# Patient Record
Sex: MALE | Race: WHITE
[De-identification: names, ages, dates, MRNs, and addresses within clinical notes are randomized per-mention and may not be internally consistent; named-entity substitution may affect disease eponyms.]

---

## 2017-10-19 ENCOUNTER — HOSPITAL ENCOUNTER (EMERGENCY)
Dept: HOSPITAL 54 - ER | Age: 67
Discharge: HOME | End: 2017-10-19
Payer: COMMERCIAL

## 2017-10-19 VITALS — HEIGHT: 71 IN | WEIGHT: 200 LBS | BODY MASS INDEX: 28 KG/M2

## 2017-10-19 VITALS — DIASTOLIC BLOOD PRESSURE: 84 MMHG | SYSTOLIC BLOOD PRESSURE: 139 MMHG

## 2017-10-19 DIAGNOSIS — E78.00: ICD-10-CM
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
## 2017-10-19 DIAGNOSIS — S39.012A: Primary | ICD-10-CM
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
## 2017-10-19 DIAGNOSIS — Z95.2: ICD-10-CM
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
## 2017-10-19 DIAGNOSIS — S49.81XA: ICD-10-CM

## 2017-10-19 DIAGNOSIS — Z88.0: ICD-10-CM

## 2017-10-19 DIAGNOSIS — I10: ICD-10-CM
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
## 2017-10-19 DIAGNOSIS — E11.9: ICD-10-CM

## 2017-10-19 PROCEDURE — 72100 X-RAY EXAM L-S SPINE 2/3 VWS: CPT

## 2017-10-19 PROCEDURE — Z7610: HCPCS

## 2017-10-19 PROCEDURE — 99284 EMERGENCY DEPT VISIT MOD MDM: CPT

## 2017-10-19 PROCEDURE — A4606 OXYGEN PROBE USED W OXIMETER: HCPCS

## 2017-10-19 PROCEDURE — 73030 X-RAY EXAM OF SHOULDER: CPT

## 2018-06-08 ENCOUNTER — HOSPITAL ENCOUNTER (EMERGENCY)
Dept: HOSPITAL 54 - ER | Age: 68
Discharge: HOME | End: 2018-06-08
Payer: MEDICARE

## 2018-06-08 VITALS — SYSTOLIC BLOOD PRESSURE: 115 MMHG | DIASTOLIC BLOOD PRESSURE: 74 MMHG

## 2018-06-08 VITALS — WEIGHT: 190 LBS | HEIGHT: 67 IN | BODY MASS INDEX: 29.82 KG/M2

## 2018-06-08 DIAGNOSIS — Z95.2: ICD-10-CM

## 2018-06-08 DIAGNOSIS — I48.91: ICD-10-CM

## 2018-06-08 DIAGNOSIS — Z88.0: ICD-10-CM

## 2018-06-08 DIAGNOSIS — E78.00: ICD-10-CM

## 2018-06-08 DIAGNOSIS — Z79.4: ICD-10-CM

## 2018-06-08 DIAGNOSIS — I10: ICD-10-CM

## 2018-06-08 DIAGNOSIS — M25.511: Primary | ICD-10-CM

## 2018-06-08 DIAGNOSIS — E11.9: ICD-10-CM

## 2018-06-08 PROCEDURE — A4606 OXYGEN PROBE USED W OXIMETER: HCPCS

## 2018-06-08 PROCEDURE — Z7610: HCPCS

## 2020-08-26 ENCOUNTER — OFFICE (OUTPATIENT)
Dept: URBAN - METROPOLITAN AREA CLINIC 57 | Facility: CLINIC | Age: 70
End: 2020-08-26

## 2020-08-26 VITALS
HEIGHT: 67 IN | SYSTOLIC BLOOD PRESSURE: 136 MMHG | WEIGHT: 195 LBS | HEART RATE: 61 BPM | DIASTOLIC BLOOD PRESSURE: 88 MMHG

## 2020-08-26 DIAGNOSIS — I10 HYPERTENSION: ICD-10-CM

## 2020-08-26 DIAGNOSIS — Z95.2 PRESENCE OF PROSTHETIC HEART VALVE: ICD-10-CM

## 2020-08-26 DIAGNOSIS — E78.5 HYPERLIPIDEMIA, UNSPECIFIED: ICD-10-CM

## 2020-08-26 DIAGNOSIS — R10.11 RUQ PAIN: ICD-10-CM

## 2020-08-26 DIAGNOSIS — E66.9 OBESITY: ICD-10-CM

## 2020-08-26 DIAGNOSIS — R94.5 ABNORMAL LFTS: ICD-10-CM

## 2020-08-26 DIAGNOSIS — E10.9 TYPE 1 DIABETES MELLITUS: ICD-10-CM

## 2020-08-26 PROCEDURE — 82272 OCCULT BLD FECES 1-3 TESTS: CPT | Performed by: INTERNAL MEDICINE

## 2020-08-26 PROCEDURE — 99204 OFFICE O/P NEW MOD 45 MIN: CPT | Performed by: INTERNAL MEDICINE
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
## 2021-04-13 ENCOUNTER — HOSPITAL ENCOUNTER (EMERGENCY)
Dept: HOSPITAL 12 - ER | Age: 71
Discharge: HOME | End: 2021-04-13
Payer: MEDICARE

## 2021-04-13 VITALS — BODY MASS INDEX: 29.82 KG/M2 | HEIGHT: 67 IN | WEIGHT: 190 LBS

## 2021-04-13 VITALS — SYSTOLIC BLOOD PRESSURE: 129 MMHG | DIASTOLIC BLOOD PRESSURE: 61 MMHG

## 2021-04-13 DIAGNOSIS — R07.89: ICD-10-CM

## 2021-04-13 DIAGNOSIS — E11.65: Primary | ICD-10-CM

## 2021-04-13 DIAGNOSIS — Z95.2: ICD-10-CM

## 2021-04-13 LAB
ALP SERPL-CCNC: 66 U/L (ref 50–136)
ALT SERPL W/O P-5'-P-CCNC: 49 U/L (ref 16–63)
APPEARANCE UR: CLEAR
AST SERPL-CCNC: 34 U/L (ref 15–37)
BASOPHILS # BLD AUTO: 0.1 K/UL (ref 0–8)
BASOPHILS NFR BLD AUTO: 0.9 % (ref 0–2)
BILIRUB DIRECT SERPL-MCNC: 0.1 MG/DL (ref 0–0.2)
BILIRUB SERPL-MCNC: 0.4 MG/DL (ref 0.2–1)
BILIRUB UR QL STRIP: NEGATIVE
BUN SERPL-MCNC: 26 MG/DL (ref 7–18)
CA PHOS CRY URNS QL MICRO: (no result) /HPF
CHLORIDE SERPL-SCNC: 103 MMOL/L (ref 98–107)
CO2 SERPL-SCNC: 27 MMOL/L (ref 21–32)
COLOR UR: YELLOW
CREAT SERPL-MCNC: 1.3 MG/DL (ref 0.6–1.3)
DEPRECATED SQUAMOUS URNS QL MICRO: (no result) /HPF
EOSINOPHIL # BLD AUTO: 0.3 K/UL (ref 0–0.7)
EOSINOPHIL NFR BLD AUTO: 3.6 % (ref 0–7)
GLUCOSE SERPL-MCNC: 226 MG/DL (ref 74–106)
GLUCOSE UR STRIP-MCNC: NEGATIVE MG/DL
HCT VFR BLD AUTO: 45.9 % (ref 36.7–47.1)
HGB BLD-MCNC: 15.5 G/DL (ref 12.5–16.3)
HGB UR QL STRIP: (no result)
KETONES UR STRIP-MCNC: NEGATIVE MG/DL
LEUKOCYTE ESTERASE UR QL STRIP: NEGATIVE
LIPASE SERPL-CCNC: 303 U/L (ref 73–393)
LYMPHOCYTES # BLD AUTO: 2.2 K/UL (ref 20–40)
LYMPHOCYTES NFR BLD AUTO: 24.7 % (ref 20.5–51.5)
MCH RBC QN AUTO: 29.9 UUG (ref 23.8–33.4)
MCHC RBC AUTO-ENTMCNC: 34 G/DL (ref 32.5–36.3)
MCV RBC AUTO: 88.4 FL (ref 73–96.2)
MONOCYTES # BLD AUTO: 0.7 K/UL (ref 2–10)
MONOCYTES NFR BLD AUTO: 8 % (ref 0–11)
MUCOUS THREADS URNS QL MICRO: (no result) /LPF
NEUTROPHILS # BLD AUTO: 5.5 K/UL (ref 1.8–8.9)
NEUTROPHILS NFR BLD AUTO: 62.8 % (ref 38.5–71.5)
NITRITE UR QL STRIP: NEGATIVE
PH UR STRIP: 5.5 [PH] (ref 5–8)
PLATELET # BLD AUTO: 164 K/UL (ref 152–348)
POTASSIUM SERPL-SCNC: 4.8 MMOL/L (ref 3.5–5.1)
RBC # BLD AUTO: 5.2 MIL/UL (ref 4.06–5.63)
RBC #/AREA URNS HPF: (no result) /HPF (ref 0–3)
SP GR UR STRIP: 1.02 (ref 1–1.03)
UROBILINOGEN UR STRIP-MCNC: 0.2 E.U./DL
WBC # BLD AUTO: 8.8 K/UL (ref 3.6–10.2)
WBC #/AREA URNS HPF: (no result) /HPF
WBC #/AREA URNS HPF: (no result) /HPF (ref 0–3)
WS STN SPEC: 7.1 G/DL (ref 6.4–8.2)

## 2021-04-13 PROCEDURE — A4663 DIALYSIS BLOOD PRESSURE CUFF: HCPCS

## 2021-04-13 NOTE — NUR
Patient discharged to home in stable condition.  Written and verbal after care 
instructions given. 

Patient verbalizes understanding of instructions. Stressed follow up or return 
to ER for worsening s/s.pt walks in steady gait .

## 2022-02-21 ENCOUNTER — HOSPITAL ENCOUNTER (INPATIENT)
Dept: HOSPITAL 54 - ER | Age: 72
LOS: 6 days | Discharge: HOME | DRG: 438 | End: 2022-02-27
Attending: INTERNAL MEDICINE | Admitting: NURSE PRACTITIONER
Payer: MEDICARE

## 2022-02-21 VITALS — DIASTOLIC BLOOD PRESSURE: 98 MMHG | SYSTOLIC BLOOD PRESSURE: 129 MMHG

## 2022-02-21 VITALS — DIASTOLIC BLOOD PRESSURE: 67 MMHG | SYSTOLIC BLOOD PRESSURE: 97 MMHG

## 2022-02-21 VITALS — SYSTOLIC BLOOD PRESSURE: 88 MMHG | DIASTOLIC BLOOD PRESSURE: 65 MMHG

## 2022-02-21 VITALS — DIASTOLIC BLOOD PRESSURE: 88 MMHG | SYSTOLIC BLOOD PRESSURE: 123 MMHG

## 2022-02-21 VITALS — DIASTOLIC BLOOD PRESSURE: 57 MMHG | SYSTOLIC BLOOD PRESSURE: 119 MMHG

## 2022-02-21 VITALS — SYSTOLIC BLOOD PRESSURE: 91 MMHG | DIASTOLIC BLOOD PRESSURE: 47 MMHG

## 2022-02-21 VITALS — DIASTOLIC BLOOD PRESSURE: 71 MMHG | SYSTOLIC BLOOD PRESSURE: 111 MMHG

## 2022-02-21 VITALS — HEIGHT: 67 IN | WEIGHT: 190 LBS | BODY MASS INDEX: 29.82 KG/M2

## 2022-02-21 VITALS — DIASTOLIC BLOOD PRESSURE: 67 MMHG | SYSTOLIC BLOOD PRESSURE: 102 MMHG

## 2022-02-21 DIAGNOSIS — E87.2: ICD-10-CM

## 2022-02-21 DIAGNOSIS — E11.65: ICD-10-CM

## 2022-02-21 DIAGNOSIS — K85.90: Primary | ICD-10-CM

## 2022-02-21 DIAGNOSIS — E87.5: ICD-10-CM

## 2022-02-21 DIAGNOSIS — I71.9: ICD-10-CM

## 2022-02-21 DIAGNOSIS — I10: ICD-10-CM

## 2022-02-21 DIAGNOSIS — D68.9: ICD-10-CM

## 2022-02-21 DIAGNOSIS — K80.80: ICD-10-CM

## 2022-02-21 DIAGNOSIS — Z79.4: ICD-10-CM

## 2022-02-21 DIAGNOSIS — Z20.822: ICD-10-CM

## 2022-02-21 DIAGNOSIS — D72.829: ICD-10-CM

## 2022-02-21 DIAGNOSIS — N17.0: ICD-10-CM

## 2022-02-21 DIAGNOSIS — K76.0: ICD-10-CM

## 2022-02-21 DIAGNOSIS — Z87.891: ICD-10-CM

## 2022-02-21 DIAGNOSIS — K21.9: ICD-10-CM

## 2022-02-21 DIAGNOSIS — E78.5: ICD-10-CM

## 2022-02-21 DIAGNOSIS — J96.01: ICD-10-CM

## 2022-02-21 DIAGNOSIS — Z95.2: ICD-10-CM

## 2022-02-21 DIAGNOSIS — E87.1: ICD-10-CM

## 2022-02-21 DIAGNOSIS — Z79.84: ICD-10-CM

## 2022-02-21 DIAGNOSIS — K52.9: ICD-10-CM

## 2022-02-21 DIAGNOSIS — E78.00: ICD-10-CM

## 2022-02-21 LAB
ALBUMIN SERPL BCP-MCNC: 3.8 G/DL (ref 3.4–5)
ALP SERPL-CCNC: 73 U/L (ref 46–116)
ALT SERPL W P-5'-P-CCNC: 88 U/L (ref 12–78)
AST SERPL W P-5'-P-CCNC: 89 U/L (ref 15–37)
BASOPHILS # BLD AUTO: 0 K/UL (ref 0–0.2)
BASOPHILS NFR BLD AUTO: 0.1 % (ref 0–2)
BILIRUB DIRECT SERPL-MCNC: 0.5 MG/DL (ref 0–0.2)
BILIRUB SERPL-MCNC: 1 MG/DL (ref 0.2–1)
BUN SERPL-MCNC: 25 MG/DL (ref 7–18)
BUN SERPL-MCNC: 36 MG/DL (ref 7–18)
BUN SERPL-MCNC: 40 MG/DL (ref 7–18)
CALCIUM SERPL-MCNC: 7.5 MG/DL (ref 8.5–10.1)
CALCIUM SERPL-MCNC: 7.7 MG/DL (ref 8.5–10.1)
CALCIUM SERPL-MCNC: 8.9 MG/DL (ref 8.5–10.1)
CHLORIDE SERPL-SCNC: 101 MMOL/L (ref 98–107)
CHLORIDE SERPL-SCNC: 98 MMOL/L (ref 98–107)
CHLORIDE SERPL-SCNC: 99 MMOL/L (ref 98–107)
CO2 SERPL-SCNC: 13 MMOL/L (ref 21–32)
CO2 SERPL-SCNC: 18 MMOL/L (ref 21–32)
CO2 SERPL-SCNC: 23 MMOL/L (ref 21–32)
CREAT SERPL-MCNC: 1.5 MG/DL (ref 0.6–1.3)
CREAT SERPL-MCNC: 3.1 MG/DL (ref 0.6–1.3)
CREAT SERPL-MCNC: 3.6 MG/DL (ref 0.6–1.3)
EOSINOPHIL NFR BLD AUTO: 0.1 % (ref 0–6)
GLUCOSE SERPL-MCNC: 354 MG/DL (ref 74–106)
GLUCOSE SERPL-MCNC: 694 MG/DL (ref 74–106)
HCT VFR BLD AUTO: 50 % (ref 39–51)
HGB BLD-MCNC: 16.4 G/DL (ref 13.5–17.5)
LYMPHOCYTES NFR BLD AUTO: 0.6 K/UL (ref 0.8–4.8)
LYMPHOCYTES NFR BLD AUTO: 2.9 % (ref 20–44)
MCHC RBC AUTO-ENTMCNC: 33 G/DL (ref 31–36)
MCV RBC AUTO: 85 FL (ref 80–96)
MONOCYTES NFR BLD AUTO: 1.2 K/UL (ref 0.1–1.3)
MONOCYTES NFR BLD AUTO: 5.8 % (ref 2–12)
NEUTROPHILS # BLD AUTO: 18.6 K/UL (ref 1.8–8.9)
NEUTROPHILS NFR BLD AUTO: 91.1 % (ref 43–81)
PLATELET # BLD AUTO: 213 K/UL (ref 150–450)
POTASSIUM SERPL-SCNC: 3.9 MMOL/L (ref 3.5–5.1)
POTASSIUM SERPL-SCNC: 5.2 MMOL/L (ref 3.5–5.1)
POTASSIUM SERPL-SCNC: 6.7 MMOL/L (ref 3.5–5.1)
PROT SERPL-MCNC: 7.2 G/DL (ref 6.4–8.2)
RBC # BLD AUTO: 5.94 MIL/UL (ref 4.5–6)
SODIUM SERPL-SCNC: 130 MMOL/L (ref 136–145)
SODIUM SERPL-SCNC: 134 MMOL/L (ref 136–145)
SODIUM SERPL-SCNC: 136 MMOL/L (ref 136–145)
WBC NRBC COR # BLD AUTO: 20.5 K/UL (ref 4.3–11)

## 2022-02-21 PROCEDURE — C9803 HOPD COVID-19 SPEC COLLECT: HCPCS

## 2022-02-21 PROCEDURE — G0378 HOSPITAL OBSERVATION PER HR: HCPCS

## 2022-02-21 PROCEDURE — A9537 TC99M MEBROFENIN: HCPCS

## 2022-02-21 PROCEDURE — A9563 P32 NA PHOSPHATE: HCPCS

## 2022-02-21 PROCEDURE — C9113 INJ PANTOPRAZOLE SODIUM, VIA: HCPCS

## 2022-02-21 PROCEDURE — A4216 STERILE WATER/SALINE, 10 ML: HCPCS

## 2022-02-21 RX ADMIN — Medication SCH EACH: at 11:58

## 2022-02-21 RX ADMIN — SODIUM CHLORIDE, SODIUM LACTATE, POTASSIUM CHLORIDE, AND CALCIUM CHLORIDE PRN MLS/HR: .6; .31; .03; .02 INJECTION, SOLUTION INTRAVENOUS at 16:52

## 2022-02-21 RX ADMIN — Medication SCH EACH: at 20:05

## 2022-02-21 RX ADMIN — DEXTROSE MONOHYDRATE PRN MG: 50 INJECTION, SOLUTION INTRAVENOUS at 08:32

## 2022-02-21 RX ADMIN — CIPROFLOXACIN SCH MLS/HR: 2 INJECTION, SOLUTION INTRAVENOUS at 06:18

## 2022-02-21 RX ADMIN — CIPROFLOXACIN SCH MLS/HR: 2 INJECTION, SOLUTION INTRAVENOUS at 17:21

## 2022-02-21 RX ADMIN — HYDROMORPHONE HYDROCHLORIDE PRN MG: 1 INJECTION, SOLUTION INTRAMUSCULAR; INTRAVENOUS; SUBCUTANEOUS at 15:53

## 2022-02-21 RX ADMIN — Medication SCH MLS/HR: at 13:00

## 2022-02-21 RX ADMIN — SODIUM CHLORIDE, SODIUM LACTATE, POTASSIUM CHLORIDE, AND CALCIUM CHLORIDE PRN MLS/HR: .6; .31; .03; .02 INJECTION, SOLUTION INTRAVENOUS at 09:44

## 2022-02-21 RX ADMIN — Medication SCH EACH: at 17:35

## 2022-02-21 RX ADMIN — Medication SCH MLS/HR: at 20:51

## 2022-02-21 RX ADMIN — DEXTROSE MONOHYDRATE PRN MG: 50 INJECTION, SOLUTION INTRAVENOUS at 22:14

## 2022-02-21 RX ADMIN — SODIUM CHLORIDE SCH MG: 9 INJECTION, SOLUTION INTRAVENOUS at 08:56

## 2022-02-21 RX ADMIN — Medication SCH MCG: at 08:56

## 2022-02-21 RX ADMIN — LISINOPRIL SCH MG: 10 TABLET ORAL at 08:55

## 2022-02-21 RX ADMIN — Medication SCH EACH: at 19:00

## 2022-02-21 RX ADMIN — Medication SCH EACH: at 07:54

## 2022-02-21 RX ADMIN — ATORVASTATIN CALCIUM SCH MG: 40 TABLET, FILM COATED ORAL at 21:49

## 2022-02-21 RX ADMIN — HYDROMORPHONE HYDROCHLORIDE PRN MG: 1 INJECTION, SOLUTION INTRAMUSCULAR; INTRAVENOUS; SUBCUTANEOUS at 08:33

## 2022-02-21 RX ADMIN — Medication SCH EACH: at 23:00

## 2022-02-21 RX ADMIN — Medication SCH EACH: at 21:05

## 2022-02-21 RX ADMIN — SODIUM CHLORIDE PRN MLS/HR: 9 INJECTION, SOLUTION INTRAVENOUS at 18:56

## 2022-02-21 RX ADMIN — DEXTROSE MONOHYDRATE PRN MG: 50 INJECTION, SOLUTION INTRAVENOUS at 15:52

## 2022-02-21 RX ADMIN — Medication SCH EACH: at 12:00

## 2022-02-21 RX ADMIN — Medication SCH EACH: at 22:00

## 2022-02-21 RX ADMIN — SODIUM CHLORIDE PRN MLS/HR: 9 INJECTION, SOLUTION INTRAVENOUS at 22:03

## 2022-02-21 RX ADMIN — SODIUM CHLORIDE, SODIUM LACTATE, POTASSIUM CHLORIDE, AND CALCIUM CHLORIDE PRN MLS/HR: .6; .31; .03; .02 INJECTION, SOLUTION INTRAVENOUS at 22:26

## 2022-02-21 NOTE — NUR
BIB WIFE FOR C/O ABD 7/10 PAIN , N/V X 2 HOURS DENIED HEMATURIA OR DYSURIA, - 
DIARRHEA. PT TOOK TYLENOL PTA AT 2000 WITH NO RELIEF. PT A/OX4. TOLERATING R/A 
AT 92% ON R/A. CONNECTED PT TO POX AND MONITOR.

## 2022-02-21 NOTE — NUR
RN ICU NOTE



RECEIVED CALL FROM LAB POTASSIUM 6.7 AND GLUCOSE 694 NOTIFIED ON CALL RAMIN MOORE SHE SAID WE 
WILL RECHECK AT 22:00 NOTED AND CARRIED OUT.

## 2022-02-21 NOTE — NUR
RN NOTE



CHECKED BLOOD SUGAR AT 2100 SHOWS HI AGAIN PER PROTOCOL STAT LAB  GLUCOSE CHECK NOTED AND 
CARRIED OUT

## 2022-02-21 NOTE — NUR
RN ICU

CARE ENDORSED TO AMADO.  INSULIN DRIP STARTED AT 10 UNITS/HR AS ORDERED. AWAITING CHEM 7 
RESULT.  ACCUCHECK>600.  REGULAR AND GLARGINE INSULINS FROM AGGRESSIVE SCALE STOPPED AS 
ORDERED.

## 2022-02-21 NOTE — NUR
RN NOTE- ACCU CHECK BS- 537 . AGGRESSIVE SSI ORDERED 20U. NP RENÉ NOTIFIED AND APPROVED. 
RECHECK IN 30 MINUTES

## 2022-02-21 NOTE — NUR
RN NOTE- 72 Y/O MALE BIB EMERGENCY ROOM STAFF TO UNIT. ADMITTED R/O PANCREATITIS. ABD PAIN 
SEVERAL DAYS. PT ALLERGIC TO PCN, SKIN INTACT . AOX4, CALM INTERACTIVE . PAIN TO EPIGASTRIC 
AREA DECREASED W DILAUDID IVP. VS- BP- 129/98, HR- 102, RR- 18, T- 98.8, 02 SATS 98% ON 2LPM 
VIA NC. PIV 18G TO RAC, INFUSING NS AT 90/HR. ORDERS RECEIVED/ COMPLIED WITH. BED LOCKED., 
SIDE RAILS UP CALL LIGHT IN REACH. MONITOR / ASSIST

## 2022-02-21 NOTE — NUR
RN ICU

RECEIVED PT FROM ER VIA BED.  ACCUCHECK DONE.  CHEM 7 ORDERED, INSULIN DRIP TO START AFTER 
ORDER RECEIVED FROM RONALDO MERRITT DNP.  PT NPO FOR NOW.

## 2022-02-21 NOTE — NUR
RN NOTE- PT BROUGHT TO ROOM, AMBULATORY TO BR, AOX4, CALM INTERACTIVE DENIES PAIN. MADE 
COMFORTABLE. BEGIN ADMISSION PROCESS

## 2022-02-21 NOTE — NUR
RN NOTE- NP RENÉ ORDERED TRANSFER OF PT TO ICU FOR BLOOD GLUCOSE CONTROL. TRANSFERRED PT 
TO ICU CARE, REPORT GIVEN IN PERSON TO ICU CHARGE PURVI SCHULTE. ASSISTED W MAKING PT COMFORTABLE 
AND ALL ORDERS FOLLOWED THROUGH.

## 2022-02-21 NOTE — NUR
RN NOTE- PT ACCUCHECK  THIS AM. NP RENÉ ON UNIT. GAVE 10 U SSI PER ORDERS. 
RECHECKED 90 MINUTES LATER ACCU CHECK BS- 534. AM DOSE GLIPIZIDE 10 MG ADMINISTERED . 
CHANGING SSI TO AGGRESSIVE FROM MODERATE

## 2022-02-21 NOTE — NUR
RN NOTE



RECEIVED PATIENT IN BED RESTING ALERT ORIENTED X3 ON 4L OXYGEN VIA NASAL CANNULA O2:90% IV 
SITE IS ON RIGHT AC INTACT PATENT ON LACTATED RINGER 250CC/HR,ON INSULIN DRIP 14 UNIT/HR 
,SAFETY MEASURE IMPLEMENT BED IN LOW POSITION AND LOCKED,CALL LIGHT WITHIN REACH,HEAD OF THE 
BED ELEVATED CONTINUE TO MONITOR.

## 2022-02-22 VITALS — DIASTOLIC BLOOD PRESSURE: 77 MMHG | SYSTOLIC BLOOD PRESSURE: 119 MMHG

## 2022-02-22 VITALS — SYSTOLIC BLOOD PRESSURE: 119 MMHG | DIASTOLIC BLOOD PRESSURE: 77 MMHG

## 2022-02-22 VITALS — SYSTOLIC BLOOD PRESSURE: 74 MMHG | DIASTOLIC BLOOD PRESSURE: 24 MMHG

## 2022-02-22 VITALS — DIASTOLIC BLOOD PRESSURE: 78 MMHG | SYSTOLIC BLOOD PRESSURE: 109 MMHG

## 2022-02-22 VITALS — DIASTOLIC BLOOD PRESSURE: 71 MMHG | SYSTOLIC BLOOD PRESSURE: 103 MMHG

## 2022-02-22 VITALS — DIASTOLIC BLOOD PRESSURE: 39 MMHG | SYSTOLIC BLOOD PRESSURE: 116 MMHG

## 2022-02-22 VITALS — DIASTOLIC BLOOD PRESSURE: 79 MMHG | SYSTOLIC BLOOD PRESSURE: 104 MMHG

## 2022-02-22 VITALS — SYSTOLIC BLOOD PRESSURE: 95 MMHG | DIASTOLIC BLOOD PRESSURE: 75 MMHG

## 2022-02-22 VITALS — SYSTOLIC BLOOD PRESSURE: 96 MMHG | DIASTOLIC BLOOD PRESSURE: 75 MMHG

## 2022-02-22 VITALS — SYSTOLIC BLOOD PRESSURE: 108 MMHG | DIASTOLIC BLOOD PRESSURE: 77 MMHG

## 2022-02-22 VITALS — DIASTOLIC BLOOD PRESSURE: 79 MMHG | SYSTOLIC BLOOD PRESSURE: 116 MMHG

## 2022-02-22 VITALS — SYSTOLIC BLOOD PRESSURE: 112 MMHG | DIASTOLIC BLOOD PRESSURE: 76 MMHG

## 2022-02-22 VITALS — SYSTOLIC BLOOD PRESSURE: 107 MMHG | DIASTOLIC BLOOD PRESSURE: 77 MMHG

## 2022-02-22 VITALS — DIASTOLIC BLOOD PRESSURE: 83 MMHG | SYSTOLIC BLOOD PRESSURE: 116 MMHG

## 2022-02-22 VITALS — DIASTOLIC BLOOD PRESSURE: 82 MMHG | SYSTOLIC BLOOD PRESSURE: 122 MMHG

## 2022-02-22 VITALS — DIASTOLIC BLOOD PRESSURE: 74 MMHG | SYSTOLIC BLOOD PRESSURE: 111 MMHG

## 2022-02-22 VITALS — SYSTOLIC BLOOD PRESSURE: 118 MMHG | DIASTOLIC BLOOD PRESSURE: 86 MMHG

## 2022-02-22 VITALS — SYSTOLIC BLOOD PRESSURE: 120 MMHG | DIASTOLIC BLOOD PRESSURE: 79 MMHG

## 2022-02-22 VITALS — SYSTOLIC BLOOD PRESSURE: 110 MMHG | DIASTOLIC BLOOD PRESSURE: 78 MMHG

## 2022-02-22 LAB
ALBUMIN SERPL BCP-MCNC: 2.1 G/DL (ref 3.4–5)
ALBUMIN SERPL BCP-MCNC: 2.2 G/DL (ref 3.4–5)
ALP SERPL-CCNC: 39 U/L (ref 46–116)
ALP SERPL-CCNC: 44 U/L (ref 46–116)
ALT SERPL W P-5'-P-CCNC: 27 U/L (ref 12–78)
ALT SERPL W P-5'-P-CCNC: 34 U/L (ref 12–78)
AST SERPL W P-5'-P-CCNC: 32 U/L (ref 15–37)
AST SERPL W P-5'-P-CCNC: 41 U/L (ref 15–37)
BASOPHILS # BLD AUTO: 0 K/UL (ref 0–0.2)
BASOPHILS NFR BLD AUTO: 0.1 % (ref 0–2)
BILIRUB DIRECT SERPL-MCNC: 0.2 MG/DL (ref 0–0.2)
BILIRUB SERPL-MCNC: 0.7 MG/DL (ref 0.2–1)
BILIRUB SERPL-MCNC: 0.7 MG/DL (ref 0.2–1)
BILIRUB UR QL STRIP: (no result)
BUN SERPL-MCNC: 26 MG/DL (ref 7–18)
BUN SERPL-MCNC: 45 MG/DL (ref 7–18)
BUN SERPL-MCNC: 50 MG/DL (ref 7–18)
CALCIUM SERPL-MCNC: 7.6 MG/DL (ref 8.5–10.1)
CALCIUM SERPL-MCNC: 7.7 MG/DL (ref 8.5–10.1)
CALCIUM SERPL-MCNC: 7.8 MG/DL (ref 8.5–10.1)
CHLORIDE SERPL-SCNC: 103 MMOL/L (ref 98–107)
CHLORIDE SERPL-SCNC: 104 MMOL/L (ref 98–107)
CHLORIDE SERPL-SCNC: 106 MMOL/L (ref 98–107)
CO2 SERPL-SCNC: 22 MMOL/L (ref 21–32)
CO2 SERPL-SCNC: 23 MMOL/L (ref 21–32)
CO2 SERPL-SCNC: 28 MMOL/L (ref 21–32)
COLOR UR: (no result)
CREAT SERPL-MCNC: 0.9 MG/DL (ref 0.6–1.3)
CREAT SERPL-MCNC: 3.7 MG/DL (ref 0.6–1.3)
CREAT SERPL-MCNC: 3.8 MG/DL (ref 0.6–1.3)
CREAT UR-MCNC: 293.6 MG/DL (ref 30–125)
EOSINOPHIL NFR BLD AUTO: 0.1 % (ref 0–6)
GLUCOSE SERPL-MCNC: 113 MG/DL (ref 74–106)
GLUCOSE SERPL-MCNC: 241 MG/DL (ref 74–106)
GLUCOSE SERPL-MCNC: 297 MG/DL (ref 74–106)
GLUCOSE SERPL-MCNC: 513 MG/DL (ref 74–106)
GLUCOSE UR STRIP-MCNC: NEGATIVE MG/DL
HCT VFR BLD AUTO: 38 % (ref 39–51)
HGB BLD-MCNC: 12.6 G/DL (ref 13.5–17.5)
LEUKOCYTE ESTERASE UR QL STRIP: NEGATIVE
LIPASE SERPL-CCNC: 3808 U/L (ref 73–393)
LYMPHOCYTES NFR BLD AUTO: 0.7 K/UL (ref 0.8–4.8)
LYMPHOCYTES NFR BLD AUTO: 8.1 % (ref 20–44)
MCHC RBC AUTO-ENTMCNC: 33 G/DL (ref 31–36)
MCV RBC AUTO: 85 FL (ref 80–96)
MONOCYTES NFR BLD AUTO: 0.6 K/UL (ref 0.1–1.3)
MONOCYTES NFR BLD AUTO: 6.3 % (ref 2–12)
NEUTROPHILS # BLD AUTO: 7.9 K/UL (ref 1.8–8.9)
NEUTROPHILS NFR BLD AUTO: 85.4 % (ref 43–81)
NITRITE UR QL STRIP: NEGATIVE
PH UR STRIP: 5 [PH] (ref 5–8)
PHOSPHATE SERPL-MCNC: 3.2 MG/DL (ref 2.5–4.9)
PLATELET # BLD AUTO: 154 K/UL (ref 150–450)
POTASSIUM SERPL-SCNC: 4.1 MMOL/L (ref 3.5–5.1)
POTASSIUM SERPL-SCNC: 4.7 MMOL/L (ref 3.5–5.1)
POTASSIUM SERPL-SCNC: 5 MMOL/L (ref 3.5–5.1)
PROT SERPL-MCNC: 5.1 G/DL (ref 6.4–8.2)
PROT SERPL-MCNC: 5.2 G/DL (ref 6.4–8.2)
PROT UR QL STRIP: 30 MG/DL
RBC # BLD AUTO: 4.49 MIL/UL (ref 4.5–6)
RBC #/AREA URNS HPF: (no result) /HPF (ref 0–2)
SODIUM SERPL-SCNC: 135 MMOL/L (ref 136–145)
SODIUM SERPL-SCNC: 137 MMOL/L (ref 136–145)
SODIUM SERPL-SCNC: 139 MMOL/L (ref 136–145)
SODIUM UR-SCNC: 16 MMOL/L (ref 40–220)
TRIGL SERPL-MCNC: 153 MG/DL (ref 30–150)
UROBILINOGEN UR STRIP-MCNC: 0.2 EU/DL
WBC #/AREA URNS HPF: (no result) /HPF (ref 0–3)
WBC NRBC COR # BLD AUTO: 9.2 K/UL (ref 4.3–11)

## 2022-02-22 RX ADMIN — Medication SCH EACH: at 01:00

## 2022-02-22 RX ADMIN — Medication SCH EACH: at 02:02

## 2022-02-22 RX ADMIN — INSULIN GLARGINE SCH UNIT: 100 INJECTION, SOLUTION SUBCUTANEOUS at 20:36

## 2022-02-22 RX ADMIN — Medication SCH EACH: at 03:04

## 2022-02-22 RX ADMIN — CIPROFLOXACIN SCH MLS/HR: 2 INJECTION, SOLUTION INTRAVENOUS at 04:56

## 2022-02-22 RX ADMIN — INSULIN HUMAN PRN UNITS: 100 INJECTION, SOLUTION PARENTERAL at 17:39

## 2022-02-22 RX ADMIN — SODIUM CHLORIDE SCH MG: 9 INJECTION, SOLUTION INTRAVENOUS at 09:54

## 2022-02-22 RX ADMIN — ATORVASTATIN CALCIUM SCH MG: 40 TABLET, FILM COATED ORAL at 21:00

## 2022-02-22 RX ADMIN — Medication SCH MCG: at 09:55

## 2022-02-22 RX ADMIN — LISINOPRIL SCH MG: 10 TABLET ORAL at 09:55

## 2022-02-22 RX ADMIN — Medication SCH EACH: at 05:20

## 2022-02-22 RX ADMIN — Medication SCH EACH: at 06:06

## 2022-02-22 RX ADMIN — SODIUM CHLORIDE, SODIUM LACTATE, POTASSIUM CHLORIDE, AND CALCIUM CHLORIDE PRN MLS/HR: .6; .31; .03; .02 INJECTION, SOLUTION INTRAVENOUS at 02:12

## 2022-02-22 RX ADMIN — INSULIN HUMAN PRN UNITS: 100 INJECTION, SOLUTION PARENTERAL at 23:32

## 2022-02-22 RX ADMIN — INSULIN HUMAN PRN UNITS: 100 INJECTION, SOLUTION PARENTERAL at 10:17

## 2022-02-22 RX ADMIN — Medication SCH EACH: at 23:31

## 2022-02-22 RX ADMIN — ACETAMINOPHEN PRN MG: 325 TABLET ORAL at 10:01

## 2022-02-22 RX ADMIN — SODIUM CHLORIDE PRN MLS/HR: 9 INJECTION, SOLUTION INTRAVENOUS at 20:04

## 2022-02-22 RX ADMIN — ENOXAPARIN SODIUM SCH MG: 80 INJECTION SUBCUTANEOUS at 09:54

## 2022-02-22 RX ADMIN — Medication SCH EACH: at 17:38

## 2022-02-22 RX ADMIN — Medication SCH EACH: at 12:23

## 2022-02-22 RX ADMIN — Medication SCH EACH: at 09:00

## 2022-02-22 RX ADMIN — SODIUM CHLORIDE PRN MLS/HR: 9 INJECTION, SOLUTION INTRAVENOUS at 15:30

## 2022-02-22 RX ADMIN — INSULIN GLARGINE SCH UNIT: 100 INJECTION, SOLUTION SUBCUTANEOUS at 20:59

## 2022-02-22 RX ADMIN — Medication SCH EACH: at 08:41

## 2022-02-22 RX ADMIN — ENOXAPARIN SODIUM SCH MG: 80 INJECTION SUBCUTANEOUS at 20:36

## 2022-02-22 RX ADMIN — Medication SCH MLS/HR: at 20:06

## 2022-02-22 RX ADMIN — Medication SCH EACH: at 00:01

## 2022-02-22 RX ADMIN — Medication SCH EACH: at 10:01

## 2022-02-22 RX ADMIN — Medication SCH MLS/HR: at 05:21

## 2022-02-22 RX ADMIN — DEXTROSE MONOHYDRATE PRN MG: 50 INJECTION, SOLUTION INTRAVENOUS at 10:15

## 2022-02-22 RX ADMIN — INSULIN GLARGINE SCH UNIT: 100 INJECTION, SOLUTION SUBCUTANEOUS at 11:30

## 2022-02-22 RX ADMIN — Medication SCH MLS/HR: at 13:32

## 2022-02-22 RX ADMIN — Medication SCH EACH: at 04:03

## 2022-02-22 RX ADMIN — INSULIN HUMAN PRN UNITS: 100 INJECTION, SOLUTION PARENTERAL at 08:23

## 2022-02-22 RX ADMIN — INSULIN HUMAN PRN UNITS: 100 INJECTION, SOLUTION PARENTERAL at 12:26

## 2022-02-22 NOTE — NUR
RN NOTES



NOTIFIED NP RAMIN MOORE, REGARDING LATEST ANION GAP-8, LATEST BS- 271, WITH NEW ORDER 
DISCONTINUE INSULIN DRIP, IVF LR 1L 250ML/HR, START D5NS 1L @ 100CC/HR AND BLOOD SUGAR Q2H X 
2 THEN BLOOD SUGAR Q4H NOTED AND CARRIED OUT

## 2022-02-22 NOTE — NUR
OPENING NOTE:  REPORT RECEIVED FROM RIVERA LOJA AND BEULAH RN.  INSULIN GTT STOPPED ON PREVIOUS 
SHIFT PER REPORT.  ACCUCHECKS ORDERS Q2X2 STARTING AT 0800, THEN Q4H ON AGGRESSIVE SCALE.  
PT APPEARS TO BE ALERT OX3, ALTHOUGH SLIGHTLY FORGETFUL.  PT CONTINUES TO BE NPO.  PT 
CHECKED ON HOURLY AND PRN BY NURSING STAFF.

## 2022-02-22 NOTE — NUR
MS RN OPENING NOTE



RECEIVED PATIENT AWAKE IN BED. A/OX4. ON 5L O2 VIS NC, NO SOB OR S/S OF RESPIRATORY 
DISTRESS. IV ACCESS RIGHT HAND 20 GAUGE AND LFA 20 GAUGE, RUNNING NS @ 250 ML/HR. SYKES 
CATHETER IN PLACE, DRAINING WELL.  SAFETY PRECAUTIONS IN PLACE. BED IN LOWEST LOCKED 
POSITION, HOB ELEVATED, SIDE RAILS UP X2, AND CALL LIGHT AND TABLE WITHIN REACH. WILL 
CONTINUE WITH PLAN OF CARE.

## 2022-02-22 NOTE — NUR
RN CLOSING NOTES



NO SIGNIFICANT CHANGES THROUGH OUT THE SHIFT, NO S/S OF DISTRESS NOTED, NO SOB NOTED, REMAIN 
0N NASAL CANULA @ 4LPM SATING AT 92%, STARTED WITH D5NS 1L @ 100ML/HR. ALL DUE MEDS GIVEN AS 
ORDERED. ALL SAFETY MEASURE PROVIDED. BED ON LOWEST POSITION, LOCKED. ENDORSED TO NEXT SHIFT

## 2022-02-22 NOTE — NUR
RN CLOSING NOTE



PATIENT A/OX4. NO S/S OF PAIN NOTED AT THIS TIME. ON 5L OXYGEN, NO DISTRESS OR SHORTNESS OF 
BREATH NOTED. IV RIGHT HAND #20G AND LFA, INTACT PATENT AND FLUSHING WELL. PATIENT HAVE A 
SYKES CATHETER IN PLACE, DRAINING WELL. PATIENT HAVE EXTERNAL CARDIAC MONITOR SINUS 
TACHYCARDIAC, . FALL AND SAFETY MEASURES IN PLACE, BED ALARM ON, BED IN LOW AND LOCK 
POSITION, CALL LIGHT AND TABLE WITHIN EASY REACH, SIDE RAILS UP X2. WILL ENDORSE TO NIGHT 
SHIFT.

## 2022-02-22 NOTE — NUR
RN NOTE



PATIENT WAS TRANSFER TO UNIT FROM ICU VIA GURNEY. PATIENT A/OX4. NO S/S OF PAIN NOTED AT 
THIS TIME. ON 5L OXYGEN, NO DISTRESS OR SHORTNESS OF BREATH NOTED. IV RIGHT HAND #20G AND 
LFA, INTACT PATENT AND FLUSHING WELL. PATIENT HAVE A SYKES CATHETER IN PLACE, DRAINING 
WELL.PATIENT ORIENTED TO ROOM SET UP AND SHOWED PATIENT HOW TO USE CALL LIGHT. FALL AND 
SAFETY MEASURES IN PLACE, BED ALARM ON, BED IN LOW AND LOCK POSITION, CALL LIGHT AND TABLE 
WITHIN EASY REACH, SIDE RAILS UP X2. WILL CONTINUE TO MONITOR.

## 2022-02-22 NOTE — NUR
TELEPHONE REPORT GIVEN TO EARL LOJA FOR PT TRANSFER TO ROOM 316-2



PT TRANSFERRED VIA BED TO ROOM 316-2, ACCOMPANIED BY PATIENTS WIFE, ALL BELONGINGS BY RN.  
HANDOFF REPORT GIVEN TO EARL LOJA.

## 2022-02-22 NOTE — NUR
RN NOTE



PATIENT NOT URINATED DURING THE SHIFT BLADDER SCAN DONE ABOUT 250CC URINE RETENTION NOTIFIED 
OSCAR FAUSTIN ON CALL SHE ORDERED MONITOR FOR NOW,NOTED AND CARRIED OUT.

## 2022-02-23 VITALS — SYSTOLIC BLOOD PRESSURE: 126 MMHG | DIASTOLIC BLOOD PRESSURE: 78 MMHG

## 2022-02-23 VITALS — SYSTOLIC BLOOD PRESSURE: 113 MMHG | DIASTOLIC BLOOD PRESSURE: 56 MMHG

## 2022-02-23 VITALS — SYSTOLIC BLOOD PRESSURE: 137 MMHG | DIASTOLIC BLOOD PRESSURE: 78 MMHG

## 2022-02-23 VITALS — SYSTOLIC BLOOD PRESSURE: 135 MMHG | DIASTOLIC BLOOD PRESSURE: 78 MMHG

## 2022-02-23 VITALS — DIASTOLIC BLOOD PRESSURE: 82 MMHG | SYSTOLIC BLOOD PRESSURE: 131 MMHG

## 2022-02-23 LAB
ALBUMIN SERPL BCP-MCNC: 1.9 G/DL (ref 3.4–5)
ALP SERPL-CCNC: 43 U/L (ref 46–116)
ALT SERPL W P-5'-P-CCNC: 26 U/L (ref 12–78)
AST SERPL W P-5'-P-CCNC: 43 U/L (ref 15–37)
BILIRUB SERPL-MCNC: 0.7 MG/DL (ref 0.2–1)
BUN SERPL-MCNC: 49 MG/DL (ref 7–18)
CALCIUM SERPL-MCNC: 7.3 MG/DL (ref 8.5–10.1)
CHLORIDE SERPL-SCNC: 107 MMOL/L (ref 98–107)
CO2 SERPL-SCNC: 21 MMOL/L (ref 21–32)
CREAT SERPL-MCNC: 2.3 MG/DL (ref 0.6–1.3)
GLUCOSE SERPL-MCNC: 239 MG/DL (ref 74–106)
POTASSIUM SERPL-SCNC: 4.6 MMOL/L (ref 3.5–5.1)
PROT SERPL-MCNC: 5 G/DL (ref 6.4–8.2)
SODIUM SERPL-SCNC: 137 MMOL/L (ref 136–145)

## 2022-02-23 RX ADMIN — INSULIN HUMAN PRN UNITS: 100 INJECTION, SOLUTION PARENTERAL at 05:07

## 2022-02-23 RX ADMIN — ENOXAPARIN SODIUM SCH MG: 80 INJECTION SUBCUTANEOUS at 21:09

## 2022-02-23 RX ADMIN — SODIUM CHLORIDE PRN MLS/HR: 9 INJECTION, SOLUTION INTRAVENOUS at 01:03

## 2022-02-23 RX ADMIN — ENOXAPARIN SODIUM SCH MG: 80 INJECTION SUBCUTANEOUS at 09:04

## 2022-02-23 RX ADMIN — Medication SCH ML: at 10:24

## 2022-02-23 RX ADMIN — INSULIN GLARGINE SCH UNIT: 100 INJECTION, SOLUTION SUBCUTANEOUS at 21:08

## 2022-02-23 RX ADMIN — SODIUM CHLORIDE SCH MLS/HR: 9 INJECTION, SOLUTION INTRAVENOUS at 18:39

## 2022-02-23 RX ADMIN — LISINOPRIL SCH MG: 10 TABLET ORAL at 09:03

## 2022-02-23 RX ADMIN — Medication SCH MLS/HR: at 20:49

## 2022-02-23 RX ADMIN — Medication SCH EACH: at 23:52

## 2022-02-23 RX ADMIN — Medication SCH MLS/HR: at 04:18

## 2022-02-23 RX ADMIN — Medication SCH ML: at 18:39

## 2022-02-23 RX ADMIN — Medication SCH EACH: at 05:06

## 2022-02-23 RX ADMIN — CIPROFLOXACIN SCH MLS/HR: 2 INJECTION, SOLUTION INTRAVENOUS at 05:36

## 2022-02-23 RX ADMIN — Medication SCH EACH: at 17:42

## 2022-02-23 RX ADMIN — SODIUM CHLORIDE SCH MG: 9 INJECTION, SOLUTION INTRAVENOUS at 09:04

## 2022-02-23 RX ADMIN — INSULIN GLARGINE SCH UNIT: 100 INJECTION, SOLUTION SUBCUTANEOUS at 10:05

## 2022-02-23 RX ADMIN — Medication SCH MCG: at 09:03

## 2022-02-23 RX ADMIN — Medication SCH EACH: at 12:35

## 2022-02-23 RX ADMIN — Medication SCH MLS/HR: at 12:35

## 2022-02-23 RX ADMIN — INSULIN HUMAN PRN UNITS: 100 INJECTION, SOLUTION PARENTERAL at 12:34

## 2022-02-23 RX ADMIN — SODIUM CHLORIDE SCH MLS/HR: 9 INJECTION, SOLUTION INTRAVENOUS at 10:09

## 2022-02-23 RX ADMIN — ATORVASTATIN CALCIUM SCH MG: 40 TABLET, FILM COATED ORAL at 21:44

## 2022-02-23 RX ADMIN — INSULIN HUMAN PRN UNITS: 100 INJECTION, SOLUTION PARENTERAL at 17:45

## 2022-02-23 RX ADMIN — INSULIN HUMAN PRN UNITS: 100 INJECTION, SOLUTION PARENTERAL at 23:56

## 2022-02-23 NOTE — NUR
MS RN CLOSING NOTE



PATIENT AWAKE IN BED. A/OX4. ON 5L O2 VIS NC, NO SOB OR S/S OF RESPIRATORY DISTRESS. IV 
ACCESS RIGHT HAND 20 GAUGE AND LFA 20 GAUGE, RUNNING NS @ 250 ML/HR. SYKES CATHETER IN 
PLACE, DRAINED 800 ML. ALL NEEDS MET AT THIS TIME. SAFETY PRECAUTIONS IN PLACE AT ALL TIMES. 
BED IN LOWEST LOCKED POSITION, HOB ELEVATED, SIDE RAILS UP X2, AND CALL LIGHT AND TABLE 
WITHIN REACH. WILL ENDORSE TO ONCOMING NURSE FOR RAUL.

## 2022-02-23 NOTE — NUR
RN NOTES



PATIENT TOLERATED CLEAR LIQUIDS, ADVANCED TO FULL LIQUIDS PER MD ORDER. WILL CONTINUE TO 
MONITOR.

## 2022-02-23 NOTE — NUR
MS RN OPENING NOTES



RECEIVED PATIENT AWAKE IN BED. A/OX4. EQUAL CHEST EXPANSION ON 5L O2 VIA NC, WITH NO S/SX OF 
RESPIRATORY DISTRESS NOTED. RIGHT HAND G#20 AND LFA G#20, RUNNING NS @ 250 ML/HR. SYKES 
CATHETER INTACT AND PATENT. PATIENT KEPT NPO PER MD ORDER. SAFETY PRECAUTIONS IN PLACE: BED 
IN LOWEST LOCKED POSITION, HOB ELEVATED, SIDE RAILS UP X2, AND CALL LIGHT WITHIN REACH. WILL 
CONTINUE TO MONITOR PATIENT

## 2022-02-23 NOTE — NUR
MS RN CLOSING NOTES



PATIENT IN BED AWAKE, A/OX4, AT TIMES FORGETFUL. CONTINUED ON 5L O2 VIA NC, WITH NO S/SX OF 
RESPIRATORY DISTRESS NOTED. RIGHT HAND G#20 INTACT WITH NS RUNNING @ 250 ML/HR. SYKES 
CATHETER INTACT AND PATENT WITH 1100ML YELLOW URINE NOTED. PATIENT IS CURRENTLY ON FULL 
LIQUIDS PER MD ORDER. DIET IS OKAY TO ADVANCE AS TOLERATED. ALL ORDERS CARRIED OUT AND NEEDS 
MET. SAFETY PRECAUTIONS IN PLACE: BED IN LOWEST LOCKED POSITION, HOB ELEVATED, SIDE RAILS UP 
X2, AND CALL LIGHT WITHIN REACH. WILL ENDORSE TO THE NIGHT SHIFT NURSE FOR RUAL

## 2022-02-24 VITALS — SYSTOLIC BLOOD PRESSURE: 145 MMHG | DIASTOLIC BLOOD PRESSURE: 79 MMHG

## 2022-02-24 VITALS — DIASTOLIC BLOOD PRESSURE: 76 MMHG | SYSTOLIC BLOOD PRESSURE: 143 MMHG

## 2022-02-24 VITALS — SYSTOLIC BLOOD PRESSURE: 135 MMHG | DIASTOLIC BLOOD PRESSURE: 78 MMHG

## 2022-02-24 LAB
ALBUMIN SERPL BCP-MCNC: 1.9 G/DL (ref 3.4–5)
ALP SERPL-CCNC: 53 U/L (ref 46–116)
ALT SERPL W P-5'-P-CCNC: 26 U/L (ref 12–78)
AST SERPL W P-5'-P-CCNC: 41 U/L (ref 15–37)
BASOPHILS # BLD AUTO: 0 K/UL (ref 0–0.2)
BASOPHILS NFR BLD AUTO: 0.1 % (ref 0–2)
BILIRUB DIRECT SERPL-MCNC: 0.2 MG/DL (ref 0–0.2)
BILIRUB SERPL-MCNC: 0.6 MG/DL (ref 0.2–1)
BUN SERPL-MCNC: 33 MG/DL (ref 7–18)
CALCIUM SERPL-MCNC: 7.4 MG/DL (ref 8.5–10.1)
CHLORIDE SERPL-SCNC: 107 MMOL/L (ref 98–107)
CO2 SERPL-SCNC: 27 MMOL/L (ref 21–32)
CREAT SERPL-MCNC: 1.4 MG/DL (ref 0.6–1.3)
EOSINOPHIL NFR BLD AUTO: 0.3 % (ref 0–6)
GLUCOSE SERPL-MCNC: 180 MG/DL (ref 74–106)
HCT VFR BLD AUTO: 39 % (ref 39–51)
HGB BLD-MCNC: 12.9 G/DL (ref 13.5–17.5)
LYMPHOCYTES NFR BLD AUTO: 0.6 K/UL (ref 0.8–4.8)
LYMPHOCYTES NFR BLD AUTO: 6.8 % (ref 20–44)
MAGNESIUM SERPL-MCNC: 1.5 MG/DL (ref 1.8–2.4)
MCHC RBC AUTO-ENTMCNC: 33 G/DL (ref 31–36)
MCV RBC AUTO: 85 FL (ref 80–96)
MONOCYTES NFR BLD AUTO: 0.4 K/UL (ref 0.1–1.3)
MONOCYTES NFR BLD AUTO: 4.8 % (ref 2–12)
NEUTROPHILS # BLD AUTO: 8 K/UL (ref 1.8–8.9)
NEUTROPHILS NFR BLD AUTO: 88 % (ref 43–81)
PHOSPHATE SERPL-MCNC: 2.1 MG/DL (ref 2.5–4.9)
PLATELET # BLD AUTO: 132 K/UL (ref 150–450)
POTASSIUM SERPL-SCNC: 4.2 MMOL/L (ref 3.5–5.1)
PROT SERPL-MCNC: 4.8 G/DL (ref 6.4–8.2)
RBC # BLD AUTO: 4.61 MIL/UL (ref 4.5–6)
SODIUM SERPL-SCNC: 140 MMOL/L (ref 136–145)
WBC NRBC COR # BLD AUTO: 9.1 K/UL (ref 4.3–11)

## 2022-02-24 RX ADMIN — MAGNESIUM SULFATE IN DEXTROSE SCH MLS/HR: 10 INJECTION, SOLUTION INTRAVENOUS at 10:17

## 2022-02-24 RX ADMIN — INSULIN GLARGINE SCH UNIT: 100 INJECTION, SOLUTION SUBCUTANEOUS at 21:06

## 2022-02-24 RX ADMIN — ENOXAPARIN SODIUM SCH MG: 80 INJECTION SUBCUTANEOUS at 21:09

## 2022-02-24 RX ADMIN — Medication SCH MLS/HR: at 04:46

## 2022-02-24 RX ADMIN — SODIUM CHLORIDE SCH MLS/HR: 9 INJECTION, SOLUTION INTRAVENOUS at 15:37

## 2022-02-24 RX ADMIN — ATORVASTATIN CALCIUM SCH MG: 40 TABLET, FILM COATED ORAL at 21:13

## 2022-02-24 RX ADMIN — Medication SCH EACH: at 12:57

## 2022-02-24 RX ADMIN — CIPROFLOXACIN SCH MLS/HR: 2 INJECTION, SOLUTION INTRAVENOUS at 06:01

## 2022-02-24 RX ADMIN — HYDROMORPHONE HYDROCHLORIDE PRN MG: 1 INJECTION, SOLUTION INTRAMUSCULAR; INTRAVENOUS; SUBCUTANEOUS at 15:15

## 2022-02-24 RX ADMIN — LISINOPRIL SCH MG: 10 TABLET ORAL at 09:13

## 2022-02-24 RX ADMIN — ENOXAPARIN SODIUM SCH MG: 80 INJECTION SUBCUTANEOUS at 09:25

## 2022-02-24 RX ADMIN — INSULIN GLARGINE SCH UNIT: 100 INJECTION, SOLUTION SUBCUTANEOUS at 09:48

## 2022-02-24 RX ADMIN — Medication SCH EACH: at 05:38

## 2022-02-24 RX ADMIN — Medication SCH ML: at 09:13

## 2022-02-24 RX ADMIN — Medication SCH MCG: at 09:13

## 2022-02-24 RX ADMIN — ZOLPIDEM TARTRATE PRN MG: 5 TABLET, FILM COATED ORAL at 21:11

## 2022-02-24 RX ADMIN — Medication SCH ML: at 18:12

## 2022-02-24 RX ADMIN — INSULIN HUMAN PRN UNITS: 100 INJECTION, SOLUTION PARENTERAL at 05:41

## 2022-02-24 RX ADMIN — SODIUM CHLORIDE SCH MG: 9 INJECTION, SOLUTION INTRAVENOUS at 09:13

## 2022-02-24 RX ADMIN — INSULIN HUMAN PRN UNITS: 100 INJECTION, SOLUTION PARENTERAL at 21:08

## 2022-02-24 RX ADMIN — Medication SCH EACH: at 18:14

## 2022-02-24 RX ADMIN — SODIUM CHLORIDE SCH MLS/HR: 9 INJECTION, SOLUTION INTRAVENOUS at 04:47

## 2022-02-24 RX ADMIN — INSULIN HUMAN PRN UNITS: 100 INJECTION, SOLUTION PARENTERAL at 18:15

## 2022-02-24 RX ADMIN — INSULIN HUMAN PRN UNITS: 100 INJECTION, SOLUTION PARENTERAL at 12:57

## 2022-02-24 RX ADMIN — Medication SCH ML: at 01:13

## 2022-02-24 RX ADMIN — MAGNESIUM SULFATE IN DEXTROSE SCH MLS/HR: 10 INJECTION, SOLUTION INTRAVENOUS at 09:12

## 2022-02-24 RX ADMIN — ACETAMINOPHEN PRN MG: 325 TABLET ORAL at 21:10

## 2022-02-24 NOTE — NUR
MS RN OPENING NOTE



Patient in bed, awake. A/O x 4, able to make needs known. On O2 at 5 LPM via NC, breathing 
evenly and unlabored. No SOB or s/s of distress noted. IV access on Right hand #20G infusing 
NS at 100 cc/hr. De Oliveira catheter in place draining to a yellow colored urine. Safety 
precautions in place: bed in low, locked position; siderails up x 2; call light within 
reach. Will continue to monitor.

## 2022-02-24 NOTE — NUR
MS RN OPENING NOTES



RECEIVED REPORT AT PATIENTS BEDSIDE. PATIENT IS AWAKE AND COMMUNICATIVE, A&OX4.  O2 IN PLACE 
VIA NC @ 5LPM. DYSPNEA ON EXERTION. IV ACCESS ON RIGHT HAND G#20 INTACT AND PATENT, RUNNING 
NS @ 100ML/HR -- NO S/SX OF INFILTRATION, DRESSING C/D/I. DENIES PAIN. REQUESTING AN ORAL 
SLEEPING AID THIS EVENING BEFORE BED. DEMONSTRATES NAD AT THIS TIME. F/C IN PLACE DRAINING 
CLEAR, TEA-COLORED URINE. SAFETY PRECAUTIONS IN PLACE. BED IN LOW, LOCKED POSITION, HOB 
ELEVATED TO SEMI-PADGETT'S POSITION, SIDE RAILS UP X2. PATIENT DEMONSTRATES ABILITY TO USE 
CALL LIGHT AND VERBALIZE NEEDS EFFECTIVELY. CALL LIGHT AND FREQUENTLY USED ITEMS WITHIN 
REACH.

## 2022-02-24 NOTE — NUR
RN CLOSING NOTES



PATIENT IN BED. AWAKE, A/OX4 AND VERBALLY RESPONSIVE. ON 5L O2 VIA NC, O2 SAT 93% AND PT 
TOLERATED WELL. IV ACCESS ON RIGHT HAND G#20 INTACT AND PATENT. RUNNING NS @ 100ML/HR. NO 
C/O PAIN OR DISCOMFORT. NO ACUTE DISTRESS.  SYKES CATHETER INTACT AND PATENT WITH TEA-COLOR 
URINE. NO SEDIMENTS NOTED. ALL DUE MEDS GIVEN AS ORDERED. ALL SAFETY PRECAUTIONS IN PLACE. 
BED IN LOWEST POSITION AND LOCKED, HOB ELEVATED, SIDE RAILS UP X2, PLACE CALL LIGHT WITHIN 
REACH. WILL ENDORSE TO MORNING SHIFT NURSE

## 2022-02-24 NOTE — NUR
RN NOTES:



BLOOD SUGAR 190, 4 UNITS OF REGULAR INSULIN GIVEN PER SLIDING SCALE. NO S/S OF 
HYPER/HYPOGLYCEMIA. WILL CONTINUE TO MONITOR

## 2022-02-24 NOTE — NUR
MS RN CLOSING NOTE



Patient in bed, resting. A/O x 4, able to make needs known. On O2 at 5 LPM via NC, breathing 
evenly and unlabored. No SOB or s/s of distress noted. IV access on Right hand #20G infusing 
NS at 100 cc/hr. De Oliveira catheter in place draining to a yellow colored urine with an output 
of 1400cc. Due meds given. Safety precautions in place: bed in low, locked position; 
siderails up x 2; call light within reach. Will endorse to night shift nurse for RAUL.

## 2022-02-25 VITALS — DIASTOLIC BLOOD PRESSURE: 85 MMHG | SYSTOLIC BLOOD PRESSURE: 131 MMHG

## 2022-02-25 VITALS — DIASTOLIC BLOOD PRESSURE: 95 MMHG | SYSTOLIC BLOOD PRESSURE: 148 MMHG

## 2022-02-25 LAB
ALBUMIN SERPL BCP-MCNC: 1.8 G/DL (ref 3.4–5)
ALP SERPL-CCNC: 53 U/L (ref 46–116)
ALT SERPL W P-5'-P-CCNC: 24 U/L (ref 12–78)
AST SERPL W P-5'-P-CCNC: 36 U/L (ref 15–37)
BILIRUB SERPL-MCNC: 0.5 MG/DL (ref 0.2–1)
BUN SERPL-MCNC: 23 MG/DL (ref 7–18)
CALCIUM SERPL-MCNC: 7.5 MG/DL (ref 8.5–10.1)
CHLORIDE SERPL-SCNC: 104 MMOL/L (ref 98–107)
CO2 SERPL-SCNC: 30 MMOL/L (ref 21–32)
CREAT SERPL-MCNC: 1.1 MG/DL (ref 0.6–1.3)
GLUCOSE SERPL-MCNC: 147 MG/DL (ref 74–106)
POTASSIUM SERPL-SCNC: 3.9 MMOL/L (ref 3.5–5.1)
PROT SERPL-MCNC: 4.9 G/DL (ref 6.4–8.2)
SODIUM SERPL-SCNC: 135 MMOL/L (ref 136–145)

## 2022-02-25 RX ADMIN — LEVOFLOXACIN SCH MLS/HR: 500 INJECTION, SOLUTION INTRAVENOUS at 09:27

## 2022-02-25 RX ADMIN — PANTOPRAZOLE SODIUM SCH MG: 40 TABLET, DELAYED RELEASE ORAL at 06:32

## 2022-02-25 RX ADMIN — Medication SCH ML: at 01:44

## 2022-02-25 RX ADMIN — INSULIN HUMAN PRN UNITS: 100 INJECTION, SOLUTION PARENTERAL at 12:51

## 2022-02-25 RX ADMIN — ZOLPIDEM TARTRATE PRN MG: 5 TABLET, FILM COATED ORAL at 21:34

## 2022-02-25 RX ADMIN — Medication SCH EACH: at 12:48

## 2022-02-25 RX ADMIN — Medication SCH DROP: at 18:25

## 2022-02-25 RX ADMIN — INSULIN GLARGINE SCH UNIT: 100 INJECTION, SOLUTION SUBCUTANEOUS at 09:51

## 2022-02-25 RX ADMIN — INSULIN HUMAN PRN UNITS: 100 INJECTION, SOLUTION PARENTERAL at 18:20

## 2022-02-25 RX ADMIN — ENOXAPARIN SODIUM SCH MG: 80 INJECTION SUBCUTANEOUS at 09:30

## 2022-02-25 RX ADMIN — Medication SCH EACH: at 18:22

## 2022-02-25 RX ADMIN — Medication SCH DROP: at 13:06

## 2022-02-25 RX ADMIN — ATORVASTATIN CALCIUM SCH MG: 40 TABLET, FILM COATED ORAL at 22:14

## 2022-02-25 RX ADMIN — WARFARIN SODIUM SCH MG: 2 TABLET ORAL at 18:22

## 2022-02-25 RX ADMIN — LISINOPRIL SCH MG: 10 TABLET ORAL at 09:28

## 2022-02-25 RX ADMIN — INSULIN GLARGINE SCH UNIT: 100 INJECTION, SOLUTION SUBCUTANEOUS at 21:17

## 2022-02-25 RX ADMIN — Medication SCH EACH: at 05:12

## 2022-02-25 RX ADMIN — SODIUM CHLORIDE SCH MLS/HR: 9 INJECTION, SOLUTION INTRAVENOUS at 02:05

## 2022-02-25 RX ADMIN — INSULIN HUMAN PRN UNITS: 100 INJECTION, SOLUTION PARENTERAL at 05:15

## 2022-02-25 RX ADMIN — Medication SCH MCG: at 09:29

## 2022-02-25 RX ADMIN — Medication SCH EACH: at 00:17

## 2022-02-25 NOTE — NUR
MS RN CLOSING NOTES



PATIENT IN BED, EYES CLOSED, RR EVEN AND UNLABORED. O2 IN PLACE VIA NC @ 5LPM. PATIENT 
DESATS TO 87% ON RA. DYSPNEA WITH MINIMAL EXERTION. PATIENT STATES HE DOES NOT WEAR O2 AT 
HOME AND DOES NOT EXPERIENCE SHORTNESS OF BREATH AT HOME. LUNG SOUNDS: RLL EXPIRATORY 
WHEEZES, LLL DIMINISHED. ENCOURAGED DEEP BREATHING AND COUGHING EXERCISES. PATIENT RETURNS 
DEMONSTRATION. IV ACCESS ON RIGHT HAND G#20 INTACT AND PATENT, RUNNING NS @ 100ML/HR -- NO 
S/SX OF INFILTRATION, DRESSING C/D/I. DENIES PAIN. PATIENT GIVEN AMBIEN PER PRN ORDER WITH 
EFFECTIVE RESULTS ON SHIFT. DEMONSTRATES NAD AT THIS TIME. F/C IN PLACE DRAINING CLEAR, 
TEA-COLORED URINE. SAFETY PRECAUTIONS IN PLACE. BED IN LOW, LOCKED POSITION, HOB ELEVATED TO 
SEMI-PADGETT'S POSITION, SIDE RAILS UP X2. PATIENT DEMONSTRATES ABILITY TO USE CALL LIGHT AND 
VERBALIZE NEEDS EFFECTIVELY. CALL LIGHT AND FREQUENTLY USED ITEMS WITHIN REACH.

## 2022-02-25 NOTE — NUR
MS RN OPENING NOTE



RECEIVED PATIENT IN BED A/OX4. NO S/S OF APPARENT DISTRESS. DENIES PAIN AT THIS TIME. SYKES 
CATH DRAINING YELLOW URINE WITH SEDIMENTS.  NO FLUIDS RUNNING AT THIS TIME. SAFETY IN PLACE. 
WILL CONTINUE WITH PLAN OF CARE FOR PATIENT.

## 2022-02-26 VITALS — SYSTOLIC BLOOD PRESSURE: 125 MMHG | DIASTOLIC BLOOD PRESSURE: 70 MMHG

## 2022-02-26 VITALS — DIASTOLIC BLOOD PRESSURE: 86 MMHG | SYSTOLIC BLOOD PRESSURE: 128 MMHG

## 2022-02-26 LAB
ALBUMIN SERPL BCP-MCNC: 1.8 G/DL (ref 3.4–5)
ALP SERPL-CCNC: 62 U/L (ref 46–116)
ALT SERPL W P-5'-P-CCNC: 24 U/L (ref 12–78)
AST SERPL W P-5'-P-CCNC: 38 U/L (ref 15–37)
BILIRUB SERPL-MCNC: 0.6 MG/DL (ref 0.2–1)
BUN SERPL-MCNC: 21 MG/DL (ref 7–18)
CALCIUM SERPL-MCNC: 7.8 MG/DL (ref 8.5–10.1)
CHLORIDE SERPL-SCNC: 99 MMOL/L (ref 98–107)
CO2 SERPL-SCNC: 27 MMOL/L (ref 21–32)
CREAT SERPL-MCNC: 1 MG/DL (ref 0.6–1.3)
GLUCOSE SERPL-MCNC: 154 MG/DL (ref 74–106)
POTASSIUM SERPL-SCNC: 3.8 MMOL/L (ref 3.5–5.1)
PROT SERPL-MCNC: 5.2 G/DL (ref 6.4–8.2)
SODIUM SERPL-SCNC: 131 MMOL/L (ref 136–145)

## 2022-02-26 RX ADMIN — Medication SCH EACH: at 06:07

## 2022-02-26 RX ADMIN — WARFARIN SODIUM SCH MG: 2 TABLET ORAL at 17:27

## 2022-02-26 RX ADMIN — ATORVASTATIN CALCIUM SCH MG: 40 TABLET, FILM COATED ORAL at 21:08

## 2022-02-26 RX ADMIN — Medication SCH EACH: at 17:28

## 2022-02-26 RX ADMIN — ACETAMINOPHEN PRN MG: 325 TABLET ORAL at 20:51

## 2022-02-26 RX ADMIN — HYDROMORPHONE HYDROCHLORIDE PRN MG: 1 INJECTION, SOLUTION INTRAMUSCULAR; INTRAVENOUS; SUBCUTANEOUS at 06:11

## 2022-02-26 RX ADMIN — LISINOPRIL SCH MG: 10 TABLET ORAL at 08:49

## 2022-02-26 RX ADMIN — LEVOFLOXACIN SCH MLS/HR: 500 INJECTION, SOLUTION INTRAVENOUS at 08:54

## 2022-02-26 RX ADMIN — INSULIN GLARGINE SCH UNIT: 100 INJECTION, SOLUTION SUBCUTANEOUS at 09:06

## 2022-02-26 RX ADMIN — Medication SCH EACH: at 00:08

## 2022-02-26 RX ADMIN — Medication SCH MCG: at 08:48

## 2022-02-26 RX ADMIN — Medication SCH DROP: at 17:27

## 2022-02-26 RX ADMIN — ZOLPIDEM TARTRATE PRN MG: 5 TABLET, FILM COATED ORAL at 21:08

## 2022-02-26 RX ADMIN — PANTOPRAZOLE SODIUM SCH MG: 40 TABLET, DELAYED RELEASE ORAL at 08:45

## 2022-02-26 RX ADMIN — INSULIN HUMAN PRN UNITS: 100 INJECTION, SOLUTION PARENTERAL at 13:12

## 2022-02-26 RX ADMIN — METFORMIN HYDROCHLORIDE SCH MG: 500 TABLET, FILM COATED ORAL at 17:27

## 2022-02-26 RX ADMIN — INSULIN HUMAN PRN UNITS: 100 INJECTION, SOLUTION PARENTERAL at 17:47

## 2022-02-26 RX ADMIN — INSULIN GLARGINE SCH UNIT: 100 INJECTION, SOLUTION SUBCUTANEOUS at 21:51

## 2022-02-26 RX ADMIN — INSULIN HUMAN PRN UNITS: 100 INJECTION, SOLUTION PARENTERAL at 21:53

## 2022-02-26 RX ADMIN — INSULIN HUMAN PRN UNITS: 100 INJECTION, SOLUTION PARENTERAL at 06:13

## 2022-02-26 RX ADMIN — Medication SCH DROP: at 01:50

## 2022-02-26 RX ADMIN — Medication SCH DROP: at 09:07

## 2022-02-26 RX ADMIN — INSULIN HUMAN PRN UNITS: 100 INJECTION, SOLUTION PARENTERAL at 00:06

## 2022-02-26 RX ADMIN — Medication SCH EACH: at 13:00

## 2022-02-26 NOTE — NUR
MS RN CLOSING NOTES

PATIENT IN BED RESTING AND A/OX4. ON O2 AT 2LPM VIA NASAL CANNULA TOLERATING WELL. NO SOB 
NOTED. NOT IN DISTRESS. WITH NO COMPLAINTS OF PAIN OR DISCOMFORT AT THIS TIME. REMOVED SYKES 
CATHETER PER PATIENT AND WIFE'S REQUEST WITH DOCTOR MILO'S PERMISSION AND CHARGE NURSE 
IS AWARE. DUE MEDS GIVEN. SAFETY MEASURES IN PLACED. CALL LIGHT WITHIN REACH. BED ON LOWEST 
LOCKED POSITION, SIDE RAILS UP X2. WILL ENDORSE TO NEXT SHIFT FOR RAUL.

## 2022-02-26 NOTE — NUR
MS RN OPENING NOTES:

RECEIVED REPORT AT PATIENT'S BEDSIDE. PATIENT'S EYES CLOSED, RR EVEN AND UNLABORED. NO 
DYSPNEA OBSERVED/REPORTED. PATIENT IN NAD AND STABLE, EASILY AROUSED BY VOICE COMMAND. 
COMMUNICATIVE.  SPO2 96% ON 2L O2 VIA NC. LUNG SOUNDS WITH FINE CRACKLES TO BLL. 
DEMONSTRATED DEEP BREATHING AND COUGHING TECHNIQUE. PATIENT RETURNS DEMONSTRATION. PATIENT 
C/O LL ABDOMINAL CRAMPING/PAIN 6-8/10 WHICH HAS BEEN INTERMITTENT SINCE ADMIT. SAFETY 
PRECAUTIONS IN PLACE. BED IN LOW, LOCKED POSITION, SIDE RAILS UP X2, HOB ELEVATED TO 
SEMI-PADGETT'S POSITION. PATIENT DEMONSTRATES ABILITY TO USE CALL LIGHT AND VERBALIZE NEEDS 
EFFECTIVELY. CALL LIGHT AND FREQUENTLY USED ITEMS WITHIN REACH.

## 2022-02-26 NOTE — NUR
MS RN OPENING NOTES

RECEIVED PATIENT IN BED AWAKE, A/OX4.ON O2 AT 5LPM VIA NASAL CANNULA TOLERATING WELL. NO SOB 
NOTED. NOT IN DISTRESS. WITH NO COMPLAINTS OF PAIN OR DISCOMFORT AT THIS TIME. SYKES 
CATHETER DRAINING TEA COLORED URINE. SAFETY MEASURES IN PLACED. CALL LIGHT WITHIN REACH. BED 
ON LOWEST LOCKED POSITION, SIDE RAILS UP X2. WILL CONTINUE TO MONITOR.

## 2022-02-26 NOTE — NUR
MS RN CLOSING



PATIENT IN BED AWAKE, A/OX3. NO S/S OF APPARENT DISTRESS-- HOB ELEVATED ON 5LPM OF O2 VIA 
NC. PAIN MANAGED WITH MEDICATION. NO FLUIDS RUNNING AT THIS TIME. SYKES CATHETER DRAINING 
TEA COLORED URINE. UO OF 1600. ALL NEEDS ATTENDED. ALL SCHEDULED MEDICATIONS ADMINISTERED. 
SAFETY IN PLACE. NO SIGNIFICANT CHANGE SINCE LAST ENDORSEMENT. REPORT GIVEN TO OLEG FOR 
CONTINUITY OF CARE.

## 2022-02-27 VITALS — SYSTOLIC BLOOD PRESSURE: 116 MMHG | DIASTOLIC BLOOD PRESSURE: 75 MMHG

## 2022-02-27 VITALS — DIASTOLIC BLOOD PRESSURE: 75 MMHG | SYSTOLIC BLOOD PRESSURE: 116 MMHG

## 2022-02-27 LAB
ALBUMIN SERPL BCP-MCNC: 1.8 G/DL (ref 3.4–5)
ALP SERPL-CCNC: 63 U/L (ref 46–116)
ALT SERPL W P-5'-P-CCNC: 27 U/L (ref 12–78)
AST SERPL W P-5'-P-CCNC: 37 U/L (ref 15–37)
BILIRUB SERPL-MCNC: 0.6 MG/DL (ref 0.2–1)
BUN SERPL-MCNC: 21 MG/DL (ref 7–18)
CALCIUM SERPL-MCNC: 8.1 MG/DL (ref 8.5–10.1)
CHLORIDE SERPL-SCNC: 98 MMOL/L (ref 98–107)
CO2 SERPL-SCNC: 34 MMOL/L (ref 21–32)
CREAT SERPL-MCNC: 1.1 MG/DL (ref 0.6–1.3)
GLUCOSE SERPL-MCNC: 170 MG/DL (ref 74–106)
LIPASE SERPL-CCNC: 589 U/L (ref 73–393)
POTASSIUM SERPL-SCNC: 3.5 MMOL/L (ref 3.5–5.1)
PROT SERPL-MCNC: 5.1 G/DL (ref 6.4–8.2)
SODIUM SERPL-SCNC: 135 MMOL/L (ref 136–145)
TSH SERPL DL<=0.005 MIU/L-ACNC: 2.06 UIU/ML (ref 0.36–3.74)
URATE SERPL-MCNC: 4.7 MG/DL (ref 2.6–7.2)

## 2022-02-27 RX ADMIN — METFORMIN HYDROCHLORIDE SCH MG: 500 TABLET, FILM COATED ORAL at 09:00

## 2022-02-27 RX ADMIN — Medication SCH MCG: at 09:21

## 2022-02-27 RX ADMIN — INSULIN GLARGINE SCH UNIT: 100 INJECTION, SOLUTION SUBCUTANEOUS at 09:16

## 2022-02-27 RX ADMIN — INSULIN HUMAN PRN UNITS: 100 INJECTION, SOLUTION PARENTERAL at 06:24

## 2022-02-27 RX ADMIN — Medication SCH DROP: at 02:03

## 2022-02-27 RX ADMIN — PANTOPRAZOLE SODIUM SCH MG: 40 TABLET, DELAYED RELEASE ORAL at 06:34

## 2022-02-27 RX ADMIN — LISINOPRIL SCH MG: 10 TABLET ORAL at 09:00

## 2022-02-27 RX ADMIN — LEVOFLOXACIN SCH MLS/HR: 500 INJECTION, SOLUTION INTRAVENOUS at 09:02

## 2022-02-27 RX ADMIN — Medication SCH EACH: at 06:21

## 2022-02-27 RX ADMIN — HYDROMORPHONE HYDROCHLORIDE PRN MG: 1 INJECTION, SOLUTION INTRAMUSCULAR; INTRAVENOUS; SUBCUTANEOUS at 02:08

## 2022-02-27 RX ADMIN — Medication SCH EACH: at 00:25

## 2022-02-27 RX ADMIN — Medication SCH DROP: at 09:19

## 2022-02-27 NOTE — NUR
MS RN CLOSING NOTES:



PATIENT IN BED, EYES CLOSED, RR EVEN AND UNLABORED. NO DYSPNEA OBSERVED/REPORTED. PATIENT IN 
NAD AND STABLE, EASILY AROUSED BY VOICE COMMAND. COMMUNICATIVE.  SPO2 96%-97% ON 2L O2 VIA 
NC. LUNG SOUNDS WITH FINE CRACKLES TO BLL. PATIENT C/O LL ABDOMINAL PAIN THROUGHOUT THE 
NIGHT, ANALGESICS PROVIDED SHORT DURATION RELIEF. PATIENT STATES HE HASN'T HAD A BM IN MORE 
THAN 2 DAYS AND NOT PASSING GAS. PATIENT GIVEN MOM PER PRN ORDER. PATIENT VOIDING TO URNIAL 
WITHOUT COMPLICATION. SAFETY PRECAUTIONS IN PLACE. BED IN LOW, LOCKED POSITION, SIDE RAILS 
UP X2, HOB ELEVATED TO SEMI-PADGETT'S POSITION. PATIENT DEMONSTRATES ABILITY TO USE CALL 
LIGHT AND VERBALIZE NEEDS EFFECTIVELY. CALL LIGHT AND FREQUENTLY USED ITEMS WITHIN REACH.

## 2022-02-27 NOTE — NUR
RN DC NOTE- PT DC INTO CARE OF FAMILY AT THIS TIME. PT VS STABLE, AOX4, CALM INTERACTIVE. ID 
WRISTBAND REMOVED, HEP LOCK IV DC'D, TOLERATED WELL. DC INSTRUCTIONS GIVEN AND UNDERSTOOD. 
PT DC IN WCR TO DOWNSTAIRS LOBBY AND ASSISTED TO WIFES CAR BY THIS RN.

## 2022-02-27 NOTE — NUR
MS RN OPENING NOTES:PT IN BED, AWAKE INTERACTIVE. PT ON RA,  SPO2 96% . DENIES PAIN 
PRESENTLY SAFETY PRECAUTIONS IN PLACE. BED IN LOW, LOCKED POSITION, SIDE RAILS UP X2, HOB 
ELEVATED TO SEMI-PADGETT'S POSITION. PATIENT DEMONSTRATES ABILITY TO USE CALL LIGHT AND 
VERBALIZE NEEDS EFFECTIVELY. CALL LIGHT AND FREQUENTLY USED ITEMS WITHIN REACH. MONITOR / 
ASSIST

## 2022-03-21 ENCOUNTER — OFFICE (OUTPATIENT)
Dept: URBAN - METROPOLITAN AREA CLINIC 57 | Facility: CLINIC | Age: 72
End: 2022-03-21

## 2022-03-21 VITALS
HEIGHT: 67 IN | RESPIRATION RATE: 16 BRPM | TEMPERATURE: 97.8 F | SYSTOLIC BLOOD PRESSURE: 121 MMHG | DIASTOLIC BLOOD PRESSURE: 81 MMHG | HEART RATE: 80 BPM | WEIGHT: 178 LBS

## 2022-03-21 DIAGNOSIS — I10 HYPERTENSION: ICD-10-CM

## 2022-03-21 DIAGNOSIS — E78.5 HYPERLIPIDEMIA, UNSPECIFIED: ICD-10-CM

## 2022-03-21 DIAGNOSIS — E11.9 DIABETES: ICD-10-CM

## 2022-03-21 DIAGNOSIS — K85.9 PANCREATITIS, ACUTE: ICD-10-CM

## 2022-03-21 DIAGNOSIS — Z95.2 PRESENCE OF PROSTHETIC HEART VALVE: ICD-10-CM

## 2022-03-21 DIAGNOSIS — I50.9 CHF (CONGESTIVE HEART FAILURE): ICD-10-CM

## 2022-03-21 DIAGNOSIS — I48.91 ATRIAL FIBRILLATION: ICD-10-CM

## 2022-03-21 PROCEDURE — 99215 OFFICE O/P EST HI 40 MIN: CPT | Performed by: INTERNAL MEDICINE

## 2022-03-21 NOTE — SERVICEHPINOTES
The patient comes in today with his wife because of abdominal pain dating to February 21, 2022. At that time after eating dinner without alcohol he developed severe epigastric abdominal pain and vomiting. This was 10 out of 10 in severity. He was admitted to [facility] a white count of 20,000 AST 89 ALT 88 lipase of 20,922 with upper limit of normal being less than 393. On discharge it was 589. There is a course of that admission he had a CT scan which showed pancreatitis and a distended gallbladder with possible wall thickening. He also had an ultrasound which showed a fatty liver, gallbladder wall thickening, pericholecystic fluid, and an 8.6 mm common bile duct. A HIDA scan was negative. After being in the hospital for 7 days he was discharged. He then saw his cardiologist Dr. Ayala and was admitted to [facility] for 4 days for treatment of congestive heart failure. During that hospitalization ALT was 44 AST 63. He states his pain has decreased to 7 out of 10 but is still persistent. He has alternating diarrhea and constipation. He denies any rectal bleeding, melena, fever or chills.

## 2025-02-26 ENCOUNTER — OFFICE (OUTPATIENT)
Dept: URBAN - METROPOLITAN AREA CLINIC 45 | Facility: CLINIC | Age: 75
End: 2025-02-26

## 2025-02-26 VITALS
SYSTOLIC BLOOD PRESSURE: 123 MMHG | WEIGHT: 189 LBS | DIASTOLIC BLOOD PRESSURE: 68 MMHG | HEIGHT: 67 IN | HEART RATE: 63 BPM

## 2025-02-26 DIAGNOSIS — Z87.19: ICD-10-CM

## 2025-02-26 DIAGNOSIS — K40.90 RIGHT INGUINAL HERNIA: ICD-10-CM

## 2025-02-26 DIAGNOSIS — R19.7 DIARRHEA (UNSPECIFIED): ICD-10-CM

## 2025-02-26 DIAGNOSIS — R19.4 CHANGE IN BOWEL HABITS: ICD-10-CM

## 2025-02-26 DIAGNOSIS — R10.30 ABDOMINAL PAIN, MULTIPLE SITES: ICD-10-CM

## 2025-02-26 PROCEDURE — 99215 OFFICE O/P EST HI 40 MIN: CPT | Performed by: INTERNAL MEDICINE

## 2025-02-26 NOTE — SERVICEHPINOTES
The patient presents for second GI opinion on evaluation and management of diarrhea and abdominal pain. Patient is accompanied by with his wife. Patient has extensive cardiovascular medical history. He is currently bothered by change in bowel habits with frequent runs of diarrhea and abdominal pain or discomfort. He was previously evaluated by a local GI in 2022 for acute pancreatitis and peripancreatic fluid collection. Ultrasound showed a fatty liver, gallbladder wall thickening, pericholecystic fluid, and an 8.6 mm common bile duct. A HIDA scan was negative. Subsequent abdominal CT scan demonstrated decrease in the size of the fluid collection. Patient was the time evaluated and conservatively followed by [facility] invasive GI. He has had prior alternating diarrhea and constipation. He denies any rectal bleeding, melena, fever or chills.

## 2025-06-09 ENCOUNTER — AMBULATORY SURGICAL CENTER (OUTPATIENT)
Dept: URBAN - METROPOLITAN AREA SURGERY 28 | Facility: SURGERY | Age: 75
End: 2025-06-09

## 2025-06-09 VITALS
SYSTOLIC BLOOD PRESSURE: 141 MMHG | HEART RATE: 56 BPM | OXYGEN SATURATION: 94 % | RESPIRATION RATE: 17 BRPM | WEIGHT: 185 LBS | TEMPERATURE: 97 F | HEIGHT: 67 IN | DIASTOLIC BLOOD PRESSURE: 87 MMHG

## 2025-06-09 DIAGNOSIS — K44.9 DIAPHRAGMATIC HERNIA WITHOUT OBSTRUCTION OR GANGRENE: ICD-10-CM

## 2025-06-09 DIAGNOSIS — R10.30 LOWER ABDOMINAL PAIN, UNSPECIFIED: ICD-10-CM

## 2025-06-09 DIAGNOSIS — K29.70 GASTRITIS, UNSPECIFIED, WITHOUT BLEEDING: ICD-10-CM

## 2025-06-09 PROCEDURE — 43239 EGD BIOPSY SINGLE/MULTIPLE: CPT | Performed by: INTERNAL MEDICINE

## 2025-06-09 PROCEDURE — 45378 DIAGNOSTIC COLONOSCOPY: CPT | Performed by: INTERNAL MEDICINE

## 2025-06-09 NOTE — SERVICEHPINOTES
The patient presents for endoscopic evaluation of change in bowel habits with frequent runs of diarrhea and abdominal pain. He previously had acute pancreatitis with fluid collection. Patient was the time evaluated and conservatively followed by [facility] GI. He has had prior alternating diarrhea and constipation. He denies any rectal bleeding, melena, fever or chills.